# Patient Record
Sex: FEMALE | Race: WHITE | Employment: UNEMPLOYED | URBAN - METROPOLITAN AREA
[De-identification: names, ages, dates, MRNs, and addresses within clinical notes are randomized per-mention and may not be internally consistent; named-entity substitution may affect disease eponyms.]

---

## 2023-04-15 ENCOUNTER — HOSPITAL ENCOUNTER (EMERGENCY)
Age: 28
Discharge: HOME | End: 2023-04-15
Payer: COMMERCIAL

## 2023-04-15 ENCOUNTER — APPOINTMENT (OUTPATIENT)
Dept: GENERAL RADIOLOGY | Age: 28
End: 2023-04-15
Payer: COMMERCIAL

## 2023-04-15 VITALS
WEIGHT: 135 LBS | TEMPERATURE: 97.4 F | HEIGHT: 66 IN | HEART RATE: 92 BPM | SYSTOLIC BLOOD PRESSURE: 126 MMHG | OXYGEN SATURATION: 100 % | RESPIRATION RATE: 18 BRPM | BODY MASS INDEX: 21.69 KG/M2 | DIASTOLIC BLOOD PRESSURE: 85 MMHG

## 2023-04-15 DIAGNOSIS — S62.639B OPEN FRACTURE OF TUFT OF DISTAL PHALANX OF FINGER: ICD-10-CM

## 2023-04-15 DIAGNOSIS — S60.10XA SUBUNGUAL HEMATOMA OF FINGERNAIL, INITIAL ENCOUNTER: Primary | ICD-10-CM

## 2023-04-15 PROCEDURE — 73140 X-RAY EXAM OF FINGER(S): CPT

## 2023-04-15 PROCEDURE — 6370000000 HC RX 637 (ALT 250 FOR IP): Performed by: NURSE PRACTITIONER

## 2023-04-15 PROCEDURE — 99283 EMERGENCY DEPT VISIT LOW MDM: CPT

## 2023-04-15 RX ORDER — CEPHALEXIN 500 MG/1
500 CAPSULE ORAL 4 TIMES DAILY
Qty: 40 CAPSULE | Refills: 0 | Status: SHIPPED | OUTPATIENT
Start: 2023-04-15 | End: 2023-04-25

## 2023-04-15 RX ORDER — CEPHALEXIN 500 MG/1
500 CAPSULE ORAL ONCE
Status: COMPLETED | OUTPATIENT
Start: 2023-04-15 | End: 2023-04-15

## 2023-04-15 RX ORDER — NAPROXEN 250 MG/1
250 TABLET ORAL 2 TIMES DAILY WITH MEALS
Qty: 20 TABLET | Refills: 1 | Status: SHIPPED | OUTPATIENT
Start: 2023-04-15

## 2023-04-15 RX ADMIN — CEPHALEXIN 500 MG: 500 CAPSULE ORAL at 20:50

## 2023-04-15 ASSESSMENT — ENCOUNTER SYMPTOMS
FACIAL SWELLING: 0
COLOR CHANGE: 0
ABDOMINAL PAIN: 0
RHINORRHEA: 0
SORE THROAT: 0
SHORTNESS OF BREATH: 0

## 2023-04-15 ASSESSMENT — PAIN - FUNCTIONAL ASSESSMENT
PAIN_FUNCTIONAL_ASSESSMENT: 0-10
PAIN_FUNCTIONAL_ASSESSMENT: NONE - DENIES PAIN

## 2023-04-15 ASSESSMENT — PAIN SCALES - GENERAL
PAINLEVEL_OUTOF10: 10
PAINLEVEL_OUTOF10: 0

## 2023-04-15 ASSESSMENT — PAIN DESCRIPTION - LOCATION: LOCATION: FINGER (COMMENT WHICH ONE)

## 2023-04-15 ASSESSMENT — PAIN DESCRIPTION - PAIN TYPE: TYPE: ACUTE PAIN

## 2023-04-15 ASSESSMENT — PAIN DESCRIPTION - ORIENTATION: ORIENTATION: RIGHT

## 2023-04-15 ASSESSMENT — PAIN DESCRIPTION - DESCRIPTORS: DESCRIPTORS: THROBBING

## 2023-04-15 NOTE — ED TRIAGE NOTES
Pt had her R ring finger stuck in a folding lounge chair on Thursday and now is bruised and c/o of 10/10 pain.

## 2023-04-15 NOTE — ED PROVIDER NOTES
to the right index finger. Patient has been able to flex and extend at the MCP DIP and PIP joint patient does have a subungual hematoma. Nail is intact. Patient is right-hand dominant   Skin:     General: Skin is warm and dry. Neurological:      Mental Status: She is alert and oriented to person, place, and time. DIAGNOSTIC RESULTS   LABS:    Labs Reviewed - No data to display      All other labs were within normal range or not returned as of this dictation. EKG: All EKG's are interpreted by the Emergency Department Physician who either signs or Co-signs this chart in the absence of a cardiologist.  Please see their note for interpretation of EKG. RADIOLOGY:   Non plain film images ans such as CT, ultrasound, and MRI are read by the radiologist. Plain radiographic images are visualized and preliminarily interpreted by the ED Provider with the below findings:      Right finger x-ray interpreted by radiologist for***      Interpretation per the Radiologist below, if available at the time of this note:    XR FINGER LEFT (MIN 2 VIEWS)    (Results Pending)     No results found. No results found. No results found. PROCEDURES   Unless otherwise noted below, none     Procedures     CRITICAL CARE TIME   Unless otherwise noted below, none          EMERGENCYDEPARTMENT COURSE/MDM:     Vitals:    Vitals:    04/15/23 1920   BP: 126/85   Pulse: 92   Resp: 18   Temp: 97.4 °F (36.3 °C)   TempSrc: Oral   SpO2: 100%   Weight: 135 lb (61.2 kg)   Height: 5' 6\" (1.676 m)         Patient was seen and evaluated by myself. Patient here for right index finger injury. Patient reports that she shut her finger in a folding chair 2 days ago and has had increased pain and blackness to the tip of her nail since then. On exam she is awake and alert hemodynamically stable nontoxic in appearance. She is neurologically intact her right finger. Patient has a subungual hematoma. Nail is intact still.   Patient is able to